# Patient Record
Sex: MALE | Employment: FULL TIME | URBAN - METROPOLITAN AREA
[De-identification: names, ages, dates, MRNs, and addresses within clinical notes are randomized per-mention and may not be internally consistent; named-entity substitution may affect disease eponyms.]

---

## 2021-06-10 ENCOUNTER — HOSPITAL ENCOUNTER (OUTPATIENT)
Age: 36
Discharge: HOME | End: 2021-06-10
Payer: COMMERCIAL

## 2021-06-10 VITALS
OXYGEN SATURATION: 96 % | BODY MASS INDEX: 26.48 KG/M2 | WEIGHT: 185 LBS | RESPIRATION RATE: 12 BRPM | HEART RATE: 84 BPM | HEIGHT: 70 IN | DIASTOLIC BLOOD PRESSURE: 78 MMHG | SYSTOLIC BLOOD PRESSURE: 115 MMHG | TEMPERATURE: 98 F

## 2021-06-10 DIAGNOSIS — S01.111A LACERATION OF RIGHT EYEBROW, INITIAL ENCOUNTER: Primary | ICD-10-CM

## 2021-06-10 PROCEDURE — 12011 RPR F/E/E/N/L/M 2.5 CM/<: CPT

## 2021-06-10 PROCEDURE — 99203 OFFICE O/P NEW LOW 30 MIN: CPT

## 2021-06-10 PROCEDURE — 90471 IMMUNIZATION ADMIN: CPT

## 2021-06-10 NOTE — ED PROVIDER NOTES
Patient Seen in: [facility]      History   Patient presents with:  Laceration    Stated Complaint: eye laceration    HPI/Subjective:   HPI    27-year-old male presents to the [facility] with right eyebrow laceration that occurred about 10 hours a normal.      Breath sounds: Normal breath sounds. Musculoskeletal:         General: Normal range of motion. Cervical back: Normal range of motion and neck supple. Skin:     General: Skin is warm and dry.       Capillary Refill: Capillary refill sharita am    Follow-up:  [facility] [id number]  [phone]  Go in 1 week  For suture removal          Medications Prescribed:  Discharge Medication List as of 6/10/2021 11:20 AM

## 2021-06-19 ENCOUNTER — OFFICE VISIT (OUTPATIENT)
Dept: INTERNAL MEDICINE CLINIC | Facility: CLINIC | Age: 36
End: 2021-06-19
Payer: COMMERCIAL

## 2021-06-19 VITALS
OXYGEN SATURATION: 99 % | HEART RATE: 90 BPM | BODY MASS INDEX: 26.88 KG/M2 | SYSTOLIC BLOOD PRESSURE: 96 MMHG | HEIGHT: 69.5 IN | DIASTOLIC BLOOD PRESSURE: 68 MMHG | RESPIRATION RATE: 16 BRPM | WEIGHT: 185.63 LBS | TEMPERATURE: 99 F

## 2021-06-19 DIAGNOSIS — M54.50 CHRONIC MIDLINE LOW BACK PAIN WITHOUT SCIATICA: ICD-10-CM

## 2021-06-19 DIAGNOSIS — R10.9 RIGHT FLANK PAIN: ICD-10-CM

## 2021-06-19 DIAGNOSIS — G89.29 CHRONIC MIDLINE LOW BACK PAIN WITHOUT SCIATICA: ICD-10-CM

## 2021-06-19 DIAGNOSIS — Z48.02 VISIT FOR SUTURE REMOVAL: ICD-10-CM

## 2021-06-19 DIAGNOSIS — Z00.00 ENCOUNTER FOR PREVENTATIVE ADULT HEALTH CARE EXAMINATION: Primary | ICD-10-CM

## 2021-06-19 PROCEDURE — 99385 PREV VISIT NEW AGE 18-39: CPT | Performed by: INTERNAL MEDICINE

## 2021-06-19 PROCEDURE — 3008F BODY MASS INDEX DOCD: CPT | Performed by: INTERNAL MEDICINE

## 2021-06-19 PROCEDURE — 3074F SYST BP LT 130 MM HG: CPT | Performed by: INTERNAL MEDICINE

## 2021-06-19 PROCEDURE — 3078F DIAST BP <80 MM HG: CPT | Performed by: INTERNAL MEDICINE

## 2021-06-19 RX ORDER — MULTIVIT-MIN/IRON FUM/FOLIC AC 7.5 MG-4
1 TABLET ORAL DAILY
COMMUNITY

## 2021-06-19 NOTE — PATIENT INSTRUCTIONS
- Get blood tests done when fasting (at least 8 hours, water only). You can schedule a lab appointment ahead of time through [address] [place] [address] or [facility] [place] [address]. [facility] or walk in  - Results are usually in Mychart within 8-12 hours.   I will send you a message explaining resu

## 2021-06-19 NOTE — PROGRESS NOTES
Fei Ordonez is a 38year old male. HPI:   Patient presents with:  Establish Care  Physical: Non Fasting; Patient presents for CPX/wellness examination and to establish care.   This is a duplicate chart - he already has a chart under [facility] [id number] file.  Family: family history includes Diabetes in his father; Heart Attack in his father; Hypertension in his mother. Social:  reports that he quit smoking about 8 years ago. He quit after 10.00 years of use.  He has never used smokeless tobacco. He repor especially when driving/sitting for long periods or when doing something like shoveling snow for a long time. Home physical therapy handout provided.   May consider referral to physical therapy clinic if symptoms persist.    4. Right flank pain  Very rare/

## 2021-06-25 ENCOUNTER — LAB ENCOUNTER (OUTPATIENT)
Dept: LAB | Age: 36
End: 2021-06-25
Attending: INTERNAL MEDICINE
Payer: COMMERCIAL

## 2021-06-25 DIAGNOSIS — Z00.00 ENCOUNTER FOR PREVENTATIVE ADULT HEALTH CARE EXAMINATION: ICD-10-CM

## 2021-06-25 PROCEDURE — 80053 COMPREHEN METABOLIC PANEL: CPT

## 2021-06-25 PROCEDURE — 36415 COLL VENOUS BLD VENIPUNCTURE: CPT

## 2021-06-25 PROCEDURE — 85025 COMPLETE CBC W/AUTO DIFF WBC: CPT

## 2021-06-25 PROCEDURE — 80061 LIPID PANEL: CPT

## 2021-06-25 PROCEDURE — 84443 ASSAY THYROID STIM HORMONE: CPT

## 2021-06-25 PROCEDURE — 83036 HEMOGLOBIN GLYCOSYLATED A1C: CPT

## 2021-07-02 ENCOUNTER — LAB ENCOUNTER (OUTPATIENT)
Dept: LAB | Age: 36
End: 2021-07-02
Attending: INTERNAL MEDICINE
Payer: COMMERCIAL

## 2021-07-02 DIAGNOSIS — Z86.39 HISTORY OF VITAMIN D DEFICIENCY: ICD-10-CM

## 2021-07-02 LAB — VIT D+METAB SERPL-MCNC: 24.3 NG/ML (ref 30–100)

## 2021-07-02 PROCEDURE — 82306 VITAMIN D 25 HYDROXY: CPT

## 2021-07-02 PROCEDURE — 36415 COLL VENOUS BLD VENIPUNCTURE: CPT

## 2022-11-14 ENCOUNTER — OFFICE VISIT (OUTPATIENT)
Dept: FAMILY MEDICINE CLINIC | Facility: CLINIC | Age: 37
End: 2022-11-14
Payer: COMMERCIAL

## 2022-11-14 VITALS
RESPIRATION RATE: 20 BRPM | HEART RATE: 80 BPM | WEIGHT: 170 LBS | BODY MASS INDEX: 25.18 KG/M2 | TEMPERATURE: 99 F | OXYGEN SATURATION: 98 % | SYSTOLIC BLOOD PRESSURE: 100 MMHG | DIASTOLIC BLOOD PRESSURE: 60 MMHG | HEIGHT: 69 IN

## 2022-11-14 DIAGNOSIS — G89.29 CHRONIC BILATERAL LOW BACK PAIN WITHOUT SCIATICA: ICD-10-CM

## 2022-11-14 DIAGNOSIS — Z00.00 LABORATORY EXAMINATION ORDERED AS PART OF A ROUTINE GENERAL MEDICAL EXAMINATION: ICD-10-CM

## 2022-11-14 DIAGNOSIS — Z23 NEED FOR VACCINATION: ICD-10-CM

## 2022-11-14 DIAGNOSIS — R79.89 LOW VITAMIN D LEVEL: ICD-10-CM

## 2022-11-14 DIAGNOSIS — Z00.00 ANNUAL PHYSICAL EXAM: Primary | ICD-10-CM

## 2022-11-14 DIAGNOSIS — M54.50 CHRONIC BILATERAL LOW BACK PAIN WITHOUT SCIATICA: ICD-10-CM

## 2022-11-14 RX ORDER — TIZANIDINE 4 MG/1
4 TABLET ORAL EVERY 6 HOURS PRN
Qty: 30 TABLET | Refills: 0 | Status: SHIPPED | OUTPATIENT
Start: 2022-11-14

## 2022-11-16 LAB
ABSOLUTE BASOPHILS: 67 CELLS/UL (ref 0–200)
ABSOLUTE EOSINOPHILS: 101 CELLS/UL (ref 15–500)
ABSOLUTE LYMPHOCYTES: 2647 CELLS/UL (ref 850–3900)
ABSOLUTE MONOCYTES: 462 CELLS/UL (ref 200–950)
ABSOLUTE NEUTROPHILS: 3424 CELLS/UL (ref 1500–7800)
ALBUMIN/GLOBULIN RATIO: 1.6 (CALC) (ref 1–2.5)
ALBUMIN: 4.5 G/DL (ref 3.6–5.1)
ALKALINE PHOSPHATASE: 76 U/L (ref 36–130)
ALT: 25 U/L (ref 9–46)
AST: 14 U/L (ref 10–40)
BASOPHILS: 1 %
BILIRUBIN, TOTAL: 0.5 MG/DL (ref 0.2–1.2)
BUN: 15 MG/DL (ref 7–25)
CALCIUM: 9.4 MG/DL (ref 8.6–10.3)
CARBON DIOXIDE: 29 MMOL/L (ref 20–32)
CHLORIDE: 104 MMOL/L (ref 98–110)
CHOL/HDLC RATIO: 4.5 (CALC)
CHOLESTEROL, TOTAL: 176 MG/DL
CREATININE: 0.89 MG/DL (ref 0.6–1.26)
EGFR: 112 ML/MIN/1.73M2
EOSINOPHILS: 1.5 %
GLOBULIN: 2.8 G/DL (CALC) (ref 1.9–3.7)
GLUCOSE: 98 MG/DL (ref 65–99)
HDL CHOLESTEROL: 39 MG/DL
HEMATOCRIT: 42.7 % (ref 38.5–50)
HEMOGLOBIN A1C: 5.4 % OF TOTAL HGB
HEMOGLOBIN: 13.8 G/DL (ref 13.2–17.1)
LDL-CHOLESTEROL: 118 MG/DL (CALC)
LYMPHOCYTES: 39.5 %
MCH: 27.3 PG (ref 27–33)
MCHC: 32.3 G/DL (ref 32–36)
MCV: 84.6 FL (ref 80–100)
MONOCYTES: 6.9 %
MPV: 11.2 FL (ref 7.5–12.5)
NEUTROPHILS: 51.1 %
NON-HDL CHOLESTEROL: 137 MG/DL (CALC)
PLATELET COUNT: 253 THOUSAND/UL (ref 140–400)
POTASSIUM: 4.4 MMOL/L (ref 3.5–5.3)
PROTEIN, TOTAL: 7.3 G/DL (ref 6.1–8.1)
RDW: 12 % (ref 11–15)
RED BLOOD CELL COUNT: 5.05 MILLION/UL (ref 4.2–5.8)
SODIUM: 139 MMOL/L (ref 135–146)
TRIGLYCERIDES: 91 MG/DL
TSH W/REFLEX TO FT4: 2.24 MIU/L (ref 0.4–4.5)
VITAMIN D, 25-OH, TOTAL: 24 NG/ML (ref 30–100)
WHITE BLOOD CELL COUNT: 6.7 THOUSAND/UL (ref 3.8–10.8)

## 2023-08-17 ENCOUNTER — TELEPHONE (OUTPATIENT)
Dept: FAMILY MEDICINE CLINIC | Facility: CLINIC | Age: 38
End: 2023-08-17